# Patient Record
Sex: FEMALE | Race: WHITE | NOT HISPANIC OR LATINO | URBAN - METROPOLITAN AREA
[De-identification: names, ages, dates, MRNs, and addresses within clinical notes are randomized per-mention and may not be internally consistent; named-entity substitution may affect disease eponyms.]

---

## 2017-10-07 ENCOUNTER — OUTPATIENT (OUTPATIENT)
Dept: OUTPATIENT SERVICES | Facility: HOSPITAL | Age: 52
LOS: 1 days | End: 2017-10-07
Payer: COMMERCIAL

## 2017-10-07 ENCOUNTER — APPOINTMENT (OUTPATIENT)
Dept: MRI IMAGING | Facility: IMAGING CENTER | Age: 52
End: 2017-10-07
Payer: COMMERCIAL

## 2017-10-07 DIAGNOSIS — Z00.8 ENCOUNTER FOR OTHER GENERAL EXAMINATION: ICD-10-CM

## 2017-10-07 PROCEDURE — C8908: CPT

## 2017-10-07 PROCEDURE — 0159T: CPT | Mod: 26

## 2017-10-07 PROCEDURE — C8937: CPT

## 2017-10-07 PROCEDURE — 77059 MRI BREAST BILATERAL: CPT | Mod: 26

## 2017-10-07 PROCEDURE — A9585: CPT

## 2017-10-16 DIAGNOSIS — R92.8 OTHER ABNORMAL AND INCONCLUSIVE FINDINGS ON DIAGNOSTIC IMAGING OF BREAST: ICD-10-CM

## 2017-10-16 DIAGNOSIS — Z85.3 PERSONAL HISTORY OF MALIGNANT NEOPLASM OF BREAST: ICD-10-CM

## 2017-10-16 DIAGNOSIS — N60.89 OTHER BENIGN MAMMARY DYSPLASIAS OF UNSPECIFIED BREAST: ICD-10-CM

## 2019-03-16 ENCOUNTER — OUTPATIENT (OUTPATIENT)
Dept: OUTPATIENT SERVICES | Facility: HOSPITAL | Age: 54
LOS: 1 days | End: 2019-03-16
Payer: COMMERCIAL

## 2019-03-16 ENCOUNTER — APPOINTMENT (OUTPATIENT)
Dept: ULTRASOUND IMAGING | Facility: IMAGING CENTER | Age: 54
End: 2019-03-16
Payer: COMMERCIAL

## 2019-03-16 DIAGNOSIS — Z00.8 ENCOUNTER FOR OTHER GENERAL EXAMINATION: ICD-10-CM

## 2019-03-16 PROCEDURE — 76700 US EXAM ABDOM COMPLETE: CPT

## 2019-03-16 PROCEDURE — 76700 US EXAM ABDOM COMPLETE: CPT | Mod: 26

## 2020-04-25 ENCOUNTER — MESSAGE (OUTPATIENT)
Age: 55
End: 2020-04-25

## 2020-05-01 LAB
SARS-COV-2 IGG SERPL IA-ACNC: <0.1 INDEX
SARS-COV-2 IGG SERPL QL IA: NEGATIVE

## 2020-11-23 ENCOUNTER — EMERGENCY (EMERGENCY)
Facility: HOSPITAL | Age: 55
LOS: 1 days | Discharge: HOME | End: 2020-11-23
Attending: PERSONAL EMERGENCY RESPONSE ATTENDANT
Payer: COMMERCIAL

## 2020-11-23 VITALS
DIASTOLIC BLOOD PRESSURE: 94 MMHG | OXYGEN SATURATION: 100 % | TEMPERATURE: 99 F | SYSTOLIC BLOOD PRESSURE: 144 MMHG | HEART RATE: 100 BPM | RESPIRATION RATE: 19 BRPM

## 2020-11-23 VITALS
SYSTOLIC BLOOD PRESSURE: 158 MMHG | DIASTOLIC BLOOD PRESSURE: 74 MMHG | TEMPERATURE: 99 F | WEIGHT: 160.06 LBS | OXYGEN SATURATION: 100 % | RESPIRATION RATE: 18 BRPM | HEART RATE: 60 BPM | HEIGHT: 65 IN

## 2020-11-23 LAB
ALBUMIN SERPL ELPH-MCNC: 4.4 G/DL — SIGNIFICANT CHANGE UP (ref 3.3–5)
ALP SERPL-CCNC: 68 U/L — SIGNIFICANT CHANGE UP (ref 40–120)
ALT FLD-CCNC: 24 U/L — SIGNIFICANT CHANGE UP (ref 10–45)
ANION GAP SERPL CALC-SCNC: 10 MMOL/L — SIGNIFICANT CHANGE UP (ref 5–17)
APTT BLD: 31.4 SEC — SIGNIFICANT CHANGE UP (ref 27.5–35.5)
AST SERPL-CCNC: 25 U/L — SIGNIFICANT CHANGE UP (ref 10–40)
BASOPHILS # BLD AUTO: 0.03 K/UL — SIGNIFICANT CHANGE UP (ref 0–0.2)
BASOPHILS NFR BLD AUTO: 0.4 % — SIGNIFICANT CHANGE UP (ref 0–2)
BILIRUB SERPL-MCNC: 0.3 MG/DL — SIGNIFICANT CHANGE UP (ref 0.2–1.2)
BUN SERPL-MCNC: 15 MG/DL — SIGNIFICANT CHANGE UP (ref 7–23)
CALCIUM SERPL-MCNC: 10 MG/DL — SIGNIFICANT CHANGE UP (ref 8.4–10.5)
CHLORIDE SERPL-SCNC: 104 MMOL/L — SIGNIFICANT CHANGE UP (ref 96–108)
CO2 SERPL-SCNC: 27 MMOL/L — SIGNIFICANT CHANGE UP (ref 22–31)
CREAT SERPL-MCNC: 0.85 MG/DL — SIGNIFICANT CHANGE UP (ref 0.5–1.3)
D DIMER BLD IA.RAPID-MCNC: <150 NG/ML DDU — SIGNIFICANT CHANGE UP
EOSINOPHIL # BLD AUTO: 0.1 K/UL — SIGNIFICANT CHANGE UP (ref 0–0.5)
EOSINOPHIL NFR BLD AUTO: 1.2 % — SIGNIFICANT CHANGE UP (ref 0–6)
GLUCOSE SERPL-MCNC: 104 MG/DL — HIGH (ref 70–99)
HCT VFR BLD CALC: 42.9 % — SIGNIFICANT CHANGE UP (ref 34.5–45)
HGB BLD-MCNC: 14.1 G/DL — SIGNIFICANT CHANGE UP (ref 11.5–15.5)
IMM GRANULOCYTES NFR BLD AUTO: 0.2 % — SIGNIFICANT CHANGE UP (ref 0–1.5)
INR BLD: 1.04 RATIO — SIGNIFICANT CHANGE UP (ref 0.88–1.16)
LYMPHOCYTES # BLD AUTO: 1.93 K/UL — SIGNIFICANT CHANGE UP (ref 1–3.3)
LYMPHOCYTES # BLD AUTO: 23.1 % — SIGNIFICANT CHANGE UP (ref 13–44)
MAGNESIUM SERPL-MCNC: 2 MG/DL — SIGNIFICANT CHANGE UP (ref 1.6–2.6)
MCHC RBC-ENTMCNC: 29.7 PG — SIGNIFICANT CHANGE UP (ref 27–34)
MCHC RBC-ENTMCNC: 32.9 GM/DL — SIGNIFICANT CHANGE UP (ref 32–36)
MCV RBC AUTO: 90.5 FL — SIGNIFICANT CHANGE UP (ref 80–100)
MONOCYTES # BLD AUTO: 0.48 K/UL — SIGNIFICANT CHANGE UP (ref 0–0.9)
MONOCYTES NFR BLD AUTO: 5.7 % — SIGNIFICANT CHANGE UP (ref 2–14)
NEUTROPHILS # BLD AUTO: 5.8 K/UL — SIGNIFICANT CHANGE UP (ref 1.8–7.4)
NEUTROPHILS NFR BLD AUTO: 69.4 % — SIGNIFICANT CHANGE UP (ref 43–77)
NRBC # BLD: 0 /100 WBCS — SIGNIFICANT CHANGE UP (ref 0–0)
PLATELET # BLD AUTO: 275 K/UL — SIGNIFICANT CHANGE UP (ref 150–400)
POTASSIUM SERPL-MCNC: 4.4 MMOL/L — SIGNIFICANT CHANGE UP (ref 3.5–5.3)
POTASSIUM SERPL-SCNC: 4.4 MMOL/L — SIGNIFICANT CHANGE UP (ref 3.5–5.3)
PROT SERPL-MCNC: 7.2 G/DL — SIGNIFICANT CHANGE UP (ref 6–8.3)
PROTHROM AB SERPL-ACNC: 12.4 SEC — SIGNIFICANT CHANGE UP (ref 10.6–13.6)
RBC # BLD: 4.74 M/UL — SIGNIFICANT CHANGE UP (ref 3.8–5.2)
RBC # FLD: 11.5 % — SIGNIFICANT CHANGE UP (ref 10.3–14.5)
SARS-COV-2 RNA SPEC QL NAA+PROBE: SIGNIFICANT CHANGE UP
SODIUM SERPL-SCNC: 141 MMOL/L — SIGNIFICANT CHANGE UP (ref 135–145)
WBC # BLD: 8.36 K/UL — SIGNIFICANT CHANGE UP (ref 3.8–10.5)
WBC # FLD AUTO: 8.36 K/UL — SIGNIFICANT CHANGE UP (ref 3.8–10.5)

## 2020-11-23 PROCEDURE — 85379 FIBRIN DEGRADATION QUANT: CPT

## 2020-11-23 PROCEDURE — 83735 ASSAY OF MAGNESIUM: CPT

## 2020-11-23 PROCEDURE — 80053 COMPREHEN METABOLIC PANEL: CPT

## 2020-11-23 PROCEDURE — U0003: CPT

## 2020-11-23 PROCEDURE — 85730 THROMBOPLASTIN TIME PARTIAL: CPT

## 2020-11-23 PROCEDURE — 71045 X-RAY EXAM CHEST 1 VIEW: CPT

## 2020-11-23 PROCEDURE — 85610 PROTHROMBIN TIME: CPT

## 2020-11-23 PROCEDURE — 99284 EMERGENCY DEPT VISIT MOD MDM: CPT

## 2020-11-23 PROCEDURE — 71045 X-RAY EXAM CHEST 1 VIEW: CPT | Mod: 26

## 2020-11-23 PROCEDURE — 36415 COLL VENOUS BLD VENIPUNCTURE: CPT

## 2020-11-23 PROCEDURE — 85025 COMPLETE CBC W/AUTO DIFF WBC: CPT

## 2020-11-23 PROCEDURE — 99283 EMERGENCY DEPT VISIT LOW MDM: CPT | Mod: 25

## 2020-11-23 RX ORDER — SODIUM CHLORIDE 9 MG/ML
500 INJECTION INTRAMUSCULAR; INTRAVENOUS; SUBCUTANEOUS ONCE
Refills: 0 | Status: COMPLETED | OUTPATIENT
Start: 2020-11-23 | End: 2020-11-23

## 2020-11-23 RX ADMIN — SODIUM CHLORIDE 500 MILLILITER(S): 9 INJECTION INTRAMUSCULAR; INTRAVENOUS; SUBCUTANEOUS at 11:17

## 2020-11-23 NOTE — ED PROVIDER NOTE - PATIENT PORTAL LINK FT
You can access the FollowMyHealth Patient Portal offered by [facility] by registering at the following website: [email or web address]. By joining "[employer]’s FollowMyHealth portal, you will also be able to view your health information using other applications (apps) compatible with our system.

## 2020-11-23 NOTE — ED PROVIDER NOTE - ATTENDING CONTRIBUTION TO CARE
Attending MD Herrera.  Agree with above.  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 Pain is non-exertional but has been constant since it started today.  Over the weekend had generalized fatigue/malaise and had one episode diarrhea.  Pt denies fevers and endorses mild cough.  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  Cough has been present x 1 wk but has worsened today.  Pt otherwise well.  No assoc n/v.  No LE edema.  PT has been taking ibu profen and drinking tea at home.  PT has clear bilateral breath sounds.  Adomen soft/non-tender.  Pt very well appearing and low-risk for PE however main complaint today is discomfort with deep inspiration.  Pt to have screening labs and d-dimer for risk stratification.

## 2020-11-23 NOTE — ED PROVIDER NOTE - CLINICAL SUMMARY MEDICAL DECISION MAKING FREE TEXT BOX
Pt is A & O x 4 in no resp distress at this time.  Counseled extensively re: coronavirus risk and need to self quarantine for 2 wks or until results return+sx improvement.  Educated extensively that though they do not currently warrant admission for supportive care, should they develop inc SOB/cough/weakness/fevers that do not respond to tylenol/motrin, inability to tolerate PO they need to return to ED for reassessment as they may then warrant supportive care measures.  Follow-up with PCP in 1 wk for reassessment.  Pt given note for 2 wk quarantine instructions for work and verbalizes understanding of above return precautions and follow-up plan.

## 2020-11-23 NOTE — ED PROVIDER NOTE - OBJECTIVE STATEMENT
54 yo female PMHx 54 yo female PMHx GERD, b/l mastectomy 2/2 BRCA precursor. 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 States has been around COVID pts in the hospital, was tested but has not received results yet. Reports just feeling very tired and run down the past few days. Woke up this AM with the chest irritation, described as mild, can't tell if it's her GERD or PNA. Felt most when she breathes in or coughs. Denies shortness of breath, dyspnea on exertion, n/v/d, abdominal pain, recent travel, neck pain/stiffness, hemoptysis, LE swelling, calf pain.

## 2020-11-23 NOTE — ED ADULT NURSE NOTE - NSIMPLEMENTINTERV_GEN_ALL_ED
Implemented All Universal Safety Interventions:  [place] to call system. Call bell, personal items and telephone within reach. Instruct patient to call for assistance. Room bathroom lighting operational. Non-slip footwear when patient is off stretcher. Physically safe environment: no spills, clutter or unnecessary equipment. Stretcher in lowest position, wheels locked, appropriate side rails in place.

## 2020-11-23 NOTE — ED PROVIDER NOTE - NSFOLLOWUPINSTRUCTIONS_ED_ALL_ED_FT
1. Follow up with your primary doctor in 1-2 days.     1. You were seen in the ED and underwent testing for the novel coronarvirus (COVID-19).  You will be notified if you test positive.    2. Until your test results are back, YOU MUST SELF-QUARANTINE until you are told to other otherwise by [facility] or the local [facility] department. This is extremely important to limit the spread of this virus. Please refer closely to the packet provided to you on the specifics of the process of self-quarantine.    3. If you end up testing positive for the virus, you will instructed as to when you can return to your usual activities. If you do not hear from anyone in 7 days, please call [phone] or your local health department for guidance.     4. Return to the ED for difficulty breathing, vomiting, abdominal pain, chest pain or any other concerning symptoms.     5. You may over the counter acetaminophen (Tylenol) 650mg every 6 hours as needed for fever greater than 100.4 or pain.  Do NOT exceed 3500mg acetaminophen in 24 hours.  Please do not take these medications if you do not have pain or fever or if you have any history of liver disease.     -------------    What is a coronavirus?  Coronaviruses are a large family of viruses that cause illnesses ranging from the common cold to more severe diseases such as Middle East Respiratory Syndrome (MERS) and Severe Acute Respiratory Syndrome (SARS).    What is Novel Coronavirus (COVID-19)?  The Centers for Disease Control and Prevention (CDC) is closely monitoring the outbreak caused by COVID-19. For the latest information about COVID-19, visit the CDC website at CDC.gov/Coronavirus    How are coronaviruses spread?  Coronaviruses can be transmitted from person to person, usually after close contact with an infected  person (for example, in a household, workplace, or healthcare setting), via droplets that become airborne after a cough or sneeze. These droplets can then infect a nearby person. Transmission can also occur by touching recently contaminated surfaces.    Is there a treatment for a COVID-19?  There is no specific treatment for disease caused by COVID-19. However, many of the symptoms can be treated based on the patient’s clinical condition. Supportive care for infected persons can be highly effective.    What are the symptoms of coronavirus infection?  It depends on the virus, but common signs include fever and/or respiratory symptoms such as cough and shortness of breath. In more severe cases, infection can cause pneumonia, severe acute respiratory syndrome, kidney failure and even death. Fortunately, most cases of COVID-19 have an illness no different than the influenza (flu), with a majority of these patients having mild symptoms and overall mortality which appears to be not much different than the flu.    What can I do to protect myself?  The best precautionary measures:  – washing your hands  – covering your cough  – disinfecting surfaces  – it is also advisable to avoid close contact with anyone showing symptoms of respiratory illness such as coughing and sneezing  – those with symptoms should wear a surgical mask when around others    What can I do to protect those around me?  If you have been identified as someone who may be infected with COVID-19, we recommend you follow the self-isolation procedures outlined on the following page to protect those around you and to limit the spread of this virus.    We recommend the below precautionary steps from now until 14 days from when you returned from your travel or date of your last known possible contact:    — Do not go to work, school or public areas. Avoid using public transportation, ridesharing or taxis.  — As much as possible, separate yourself from other people in your home. If you can, you should stay in a room and away from other people. Also, you should use a separate bathroom if available.  — Wear the supplied mask whenever you are around other people.  — If you have a non-urgent medical appointment, please reschedule for a later date. If the appointment is urgent, please call the health care provider and tell them that you are on self-isolation for possible COVID-19. This will help the health care provider’s office take steps to keep other people from getting infected or exposed. If you can reschedule routine appointments, do so.  — Wash your hands often with soap and water for at least 15 to 20 seconds or clean your hands with an alcohol-based hand  that contains 60 to 95% alcohol, covering all surfaces of your hands and rubbing them together until they feel dry. Soap and water should be used preferentially if hands are visibly dirty.  — Cover your mouth and nose with a tissue when you cough or sneeze. Throw used tissues in a lined trash can. Immediately wash your hands.  — Avoid touching your eyes, nose, and mouth with your hands.  — Avoid sharing personal household items. You should not share dishes, drinking glasses, cups, eating utensils, towels, or bedding with other people or pets in your home. After using these items, they should be washed thoroughly with soap and water.  — Clean and disinfect all “high-touch” surfaces every day. High touch surfaces include counters, tabletops, doorknobs, light switches, remote controls, bathroom fixtures, toilets, phones, keyboards, tablets, and bedside tables. Also, clean any surfaces that may have blood, stool, or body fluids on them.    ------------------------------------------  Information for patients who have received a COVID-19 test.    The COVID-19 (novel coronavirus) test  Results may take up to 7 days to become available.      If your result is positive, you will receive a phone call from one of our coronavirus specialists. While we will do our best to also call patients with a negative test result, the sheer volume of tests being performed may make this difficult to do in a timely fashion. If you haven’t heard from us within 5 days and you’d like to check on your results, you can check our [employer] jacklyn or call one of our coronavirus specialists at [address] [place] [address] (available 24/7)    Please DO NOT call the site where you received the test to obtain your results.    If the test is positive -   You will continue home isolation until you are completely well, you have no fever, and it has been at least 14 days since your positive test. The health department in your city or county may also contact you with additional instructions.    If your test is negative -    You will be able to stop home isolation and resume standard precautions, similar to how you would manage the common cold or flu.  If you have any questions, you can reach out to one of our coronavirus specialists at [address] [place] [address].    REMEMBER - a negative COVID test means you were negative AT THE TIME OF TESTING, and it is possible to have contracted COVID after being tested.

## 2020-11-23 NOTE — ED PROVIDER NOTE - PROGRESS NOTE DETAILS
labs non-actionable, dimer negative, CXR clear, vitals stable. Will d/c home with instructions to continue quarantine pending COVID result. Advised pt on strict return precautions and counseled on all follow up information. Attending aware, stable for d/c. - Jerrod Cook PA-C

## 2020-11-23 NOTE — ED PROVIDER NOTE - PMH
Anxiety    Atypical Lobular Hyperplasia of Breast    Family History of Breast Cancer    Hiatal Hernia    MVP (Mitral Valve Prolapse)

## 2020-11-23 NOTE — ED ADULT NURSE NOTE - OBJECTIVE STATEMENT
Patient presents to Ed with c/o chest discomfort cough and fatigue. Patient reports experiencing cough and fatigue for   3 days and one episode of diarrhea yesterday and chest discomfort on inspiration. Patient reports having a Covid test done  today at [facility] and was told results unavailable for 24 to 48 hours. Patient denies chest pain or sob, no   nausea vomiting fever chills +mild headache no dizziness. RAC 18g iv lock placed, labs drawn and sent,

## 2023-12-09 ENCOUNTER — EMERGENCY (EMERGENCY)
Facility: HOSPITAL | Age: 58
LOS: 1 days | Discharge: HOME | End: 2023-12-09
Attending: EMERGENCY MEDICINE
Payer: MEDICAID

## 2023-12-09 VITALS
SYSTOLIC BLOOD PRESSURE: 120 MMHG | RESPIRATION RATE: 20 BRPM | DIASTOLIC BLOOD PRESSURE: 74 MMHG | HEART RATE: 48 BPM | TEMPERATURE: 98 F | OXYGEN SATURATION: 97 %

## 2023-12-09 VITALS
SYSTOLIC BLOOD PRESSURE: 159 MMHG | RESPIRATION RATE: 16 BRPM | HEART RATE: 47 BPM | WEIGHT: 160.06 LBS | OXYGEN SATURATION: 98 % | DIASTOLIC BLOOD PRESSURE: 76 MMHG | TEMPERATURE: 98 F | HEIGHT: 65 IN

## 2023-12-09 LAB
ALBUMIN SERPL ELPH-MCNC: 4.8 G/DL — SIGNIFICANT CHANGE UP (ref 3.3–5)
ALBUMIN SERPL ELPH-MCNC: 4.8 G/DL — SIGNIFICANT CHANGE UP (ref 3.3–5)
ALP SERPL-CCNC: 88 U/L — SIGNIFICANT CHANGE UP (ref 40–120)
ALP SERPL-CCNC: 88 U/L — SIGNIFICANT CHANGE UP (ref 40–120)
ALT FLD-CCNC: 44 U/L — SIGNIFICANT CHANGE UP (ref 10–45)
ALT FLD-CCNC: 44 U/L — SIGNIFICANT CHANGE UP (ref 10–45)
ANION GAP SERPL CALC-SCNC: 14 MMOL/L — SIGNIFICANT CHANGE UP (ref 5–17)
ANION GAP SERPL CALC-SCNC: 14 MMOL/L — SIGNIFICANT CHANGE UP (ref 5–17)
AST SERPL-CCNC: 32 U/L — SIGNIFICANT CHANGE UP (ref 10–40)
AST SERPL-CCNC: 32 U/L — SIGNIFICANT CHANGE UP (ref 10–40)
BASOPHILS # BLD AUTO: 0.06 K/UL — SIGNIFICANT CHANGE UP (ref 0–0.2)
BASOPHILS # BLD AUTO: 0.06 K/UL — SIGNIFICANT CHANGE UP (ref 0–0.2)
BASOPHILS NFR BLD AUTO: 0.6 % — SIGNIFICANT CHANGE UP (ref 0–2)
BASOPHILS NFR BLD AUTO: 0.6 % — SIGNIFICANT CHANGE UP (ref 0–2)
BILIRUB SERPL-MCNC: 0.3 MG/DL — SIGNIFICANT CHANGE UP (ref 0.2–1.2)
BILIRUB SERPL-MCNC: 0.3 MG/DL — SIGNIFICANT CHANGE UP (ref 0.2–1.2)
BUN SERPL-MCNC: 16 MG/DL — SIGNIFICANT CHANGE UP (ref 7–23)
BUN SERPL-MCNC: 16 MG/DL — SIGNIFICANT CHANGE UP (ref 7–23)
CALCIUM SERPL-MCNC: 10.3 MG/DL — SIGNIFICANT CHANGE UP (ref 8.4–10.5)
CALCIUM SERPL-MCNC: 10.3 MG/DL — SIGNIFICANT CHANGE UP (ref 8.4–10.5)
CHLORIDE SERPL-SCNC: 105 MMOL/L — SIGNIFICANT CHANGE UP (ref 96–108)
CHLORIDE SERPL-SCNC: 105 MMOL/L — SIGNIFICANT CHANGE UP (ref 96–108)
CK MB CFR SERPL CALC: 2 NG/ML — SIGNIFICANT CHANGE UP (ref 0–3.8)
CK MB CFR SERPL CALC: 2 NG/ML — SIGNIFICANT CHANGE UP (ref 0–3.8)
CO2 SERPL-SCNC: 22 MMOL/L — SIGNIFICANT CHANGE UP (ref 22–31)
CO2 SERPL-SCNC: 22 MMOL/L — SIGNIFICANT CHANGE UP (ref 22–31)
CREAT SERPL-MCNC: 0.94 MG/DL — SIGNIFICANT CHANGE UP (ref 0.5–1.3)
CREAT SERPL-MCNC: 0.94 MG/DL — SIGNIFICANT CHANGE UP (ref 0.5–1.3)
EGFR: 69 ML/MIN/1.73M2 — SIGNIFICANT CHANGE UP
EGFR: 69 ML/MIN/1.73M2 — SIGNIFICANT CHANGE UP
EOSINOPHIL # BLD AUTO: 0.14 K/UL — SIGNIFICANT CHANGE UP (ref 0–0.5)
EOSINOPHIL # BLD AUTO: 0.14 K/UL — SIGNIFICANT CHANGE UP (ref 0–0.5)
EOSINOPHIL NFR BLD AUTO: 1.4 % — SIGNIFICANT CHANGE UP (ref 0–6)
EOSINOPHIL NFR BLD AUTO: 1.4 % — SIGNIFICANT CHANGE UP (ref 0–6)
GLUCOSE SERPL-MCNC: 90 MG/DL — SIGNIFICANT CHANGE UP (ref 70–99)
GLUCOSE SERPL-MCNC: 90 MG/DL — SIGNIFICANT CHANGE UP (ref 70–99)
HCT VFR BLD CALC: 44.8 % — SIGNIFICANT CHANGE UP (ref 34.5–45)
HCT VFR BLD CALC: 44.8 % — SIGNIFICANT CHANGE UP (ref 34.5–45)
HGB BLD-MCNC: 14.5 G/DL — SIGNIFICANT CHANGE UP (ref 11.5–15.5)
HGB BLD-MCNC: 14.5 G/DL — SIGNIFICANT CHANGE UP (ref 11.5–15.5)
IMM GRANULOCYTES NFR BLD AUTO: 0.2 % — SIGNIFICANT CHANGE UP (ref 0–0.9)
IMM GRANULOCYTES NFR BLD AUTO: 0.2 % — SIGNIFICANT CHANGE UP (ref 0–0.9)
LIDOCAIN IGE QN: 40 U/L — SIGNIFICANT CHANGE UP (ref 7–60)
LIDOCAIN IGE QN: 40 U/L — SIGNIFICANT CHANGE UP (ref 7–60)
LYMPHOCYTES # BLD AUTO: 29.6 % — SIGNIFICANT CHANGE UP (ref 13–44)
LYMPHOCYTES # BLD AUTO: 29.6 % — SIGNIFICANT CHANGE UP (ref 13–44)
LYMPHOCYTES # BLD AUTO: 3 K/UL — SIGNIFICANT CHANGE UP (ref 1–3.3)
LYMPHOCYTES # BLD AUTO: 3 K/UL — SIGNIFICANT CHANGE UP (ref 1–3.3)
MCHC RBC-ENTMCNC: 30 PG — SIGNIFICANT CHANGE UP (ref 27–34)
MCHC RBC-ENTMCNC: 30 PG — SIGNIFICANT CHANGE UP (ref 27–34)
MCHC RBC-ENTMCNC: 32.4 GM/DL — SIGNIFICANT CHANGE UP (ref 32–36)
MCHC RBC-ENTMCNC: 32.4 GM/DL — SIGNIFICANT CHANGE UP (ref 32–36)
MCV RBC AUTO: 92.6 FL — SIGNIFICANT CHANGE UP (ref 80–100)
MCV RBC AUTO: 92.6 FL — SIGNIFICANT CHANGE UP (ref 80–100)
MONOCYTES # BLD AUTO: 0.7 K/UL — SIGNIFICANT CHANGE UP (ref 0–0.9)
MONOCYTES # BLD AUTO: 0.7 K/UL — SIGNIFICANT CHANGE UP (ref 0–0.9)
MONOCYTES NFR BLD AUTO: 6.9 % — SIGNIFICANT CHANGE UP (ref 2–14)
MONOCYTES NFR BLD AUTO: 6.9 % — SIGNIFICANT CHANGE UP (ref 2–14)
NEUTROPHILS # BLD AUTO: 6.2 K/UL — SIGNIFICANT CHANGE UP (ref 1.8–7.4)
NEUTROPHILS # BLD AUTO: 6.2 K/UL — SIGNIFICANT CHANGE UP (ref 1.8–7.4)
NEUTROPHILS NFR BLD AUTO: 61.3 % — SIGNIFICANT CHANGE UP (ref 43–77)
NEUTROPHILS NFR BLD AUTO: 61.3 % — SIGNIFICANT CHANGE UP (ref 43–77)
NRBC # BLD: 0 /100 WBCS — SIGNIFICANT CHANGE UP (ref 0–0)
NRBC # BLD: 0 /100 WBCS — SIGNIFICANT CHANGE UP (ref 0–0)
PLATELET # BLD AUTO: 353 K/UL — SIGNIFICANT CHANGE UP (ref 150–400)
PLATELET # BLD AUTO: 353 K/UL — SIGNIFICANT CHANGE UP (ref 150–400)
POTASSIUM SERPL-MCNC: 4.4 MMOL/L — SIGNIFICANT CHANGE UP (ref 3.5–5.3)
POTASSIUM SERPL-MCNC: 4.4 MMOL/L — SIGNIFICANT CHANGE UP (ref 3.5–5.3)
POTASSIUM SERPL-SCNC: 4.4 MMOL/L — SIGNIFICANT CHANGE UP (ref 3.5–5.3)
POTASSIUM SERPL-SCNC: 4.4 MMOL/L — SIGNIFICANT CHANGE UP (ref 3.5–5.3)
PROT SERPL-MCNC: 7.8 G/DL — SIGNIFICANT CHANGE UP (ref 6–8.3)
PROT SERPL-MCNC: 7.8 G/DL — SIGNIFICANT CHANGE UP (ref 6–8.3)
RBC # BLD: 4.84 M/UL — SIGNIFICANT CHANGE UP (ref 3.8–5.2)
RBC # BLD: 4.84 M/UL — SIGNIFICANT CHANGE UP (ref 3.8–5.2)
RBC # FLD: 11.9 % — SIGNIFICANT CHANGE UP (ref 10.3–14.5)
RBC # FLD: 11.9 % — SIGNIFICANT CHANGE UP (ref 10.3–14.5)
SODIUM SERPL-SCNC: 141 MMOL/L — SIGNIFICANT CHANGE UP (ref 135–145)
SODIUM SERPL-SCNC: 141 MMOL/L — SIGNIFICANT CHANGE UP (ref 135–145)
TROPONIN T, HIGH SENSITIVITY RESULT: <6 NG/L — SIGNIFICANT CHANGE UP (ref 0–51)
TROPONIN T, HIGH SENSITIVITY RESULT: <6 NG/L — SIGNIFICANT CHANGE UP (ref 0–51)
WBC # BLD: 10.12 K/UL — SIGNIFICANT CHANGE UP (ref 3.8–10.5)
WBC # BLD: 10.12 K/UL — SIGNIFICANT CHANGE UP (ref 3.8–10.5)
WBC # FLD AUTO: 10.12 K/UL — SIGNIFICANT CHANGE UP (ref 3.8–10.5)
WBC # FLD AUTO: 10.12 K/UL — SIGNIFICANT CHANGE UP (ref 3.8–10.5)

## 2023-12-09 PROCEDURE — 83690 ASSAY OF LIPASE: CPT

## 2023-12-09 PROCEDURE — 36415 COLL VENOUS BLD VENIPUNCTURE: CPT

## 2023-12-09 PROCEDURE — 99285 EMERGENCY DEPT VISIT HI MDM: CPT

## 2023-12-09 PROCEDURE — 71046 X-RAY EXAM CHEST 2 VIEWS: CPT | Mod: 26

## 2023-12-09 PROCEDURE — 71046 X-RAY EXAM CHEST 2 VIEWS: CPT

## 2023-12-09 PROCEDURE — 99284 EMERGENCY DEPT VISIT MOD MDM: CPT | Mod: 25

## 2023-12-09 PROCEDURE — 82553 CREATINE MB FRACTION: CPT

## 2023-12-09 PROCEDURE — 85025 COMPLETE CBC W/AUTO DIFF WBC: CPT

## 2023-12-09 PROCEDURE — 96374 THER/PROPH/DIAG INJ IV PUSH: CPT

## 2023-12-09 PROCEDURE — 84484 ASSAY OF TROPONIN QUANT: CPT

## 2023-12-09 PROCEDURE — 80053 COMPREHEN METABOLIC PANEL: CPT

## 2023-12-09 RX ORDER — KETOROLAC TROMETHAMINE 30 MG/ML
15 SYRINGE (ML) INJECTION ONCE
Refills: 0 | Status: DISCONTINUED | OUTPATIENT
Start: 2023-12-09 | End: 2023-12-09

## 2023-12-09 RX ADMIN — Medication 15 MILLIGRAM(S): at 15:08

## 2023-12-09 NOTE — ED ADULT NURSE NOTE - NSICDXPASTSURGICALHX_GEN_ALL_CORE_FT
PAST SURGICAL HISTORY:  Cyst surgically removed from lip age 6    Injury of Finger age 1, surgical correction left 3rd finger    S/P Breast Lumpectomy 6/11 Atypical Lobular Hyperplasia

## 2023-12-09 NOTE — ED PROVIDER NOTE - ATTENDING CONTRIBUTION TO CARE
Attending MD Leong:  I personally have seen and examined this patient. I have performed a substantive portion of the visit including all aspects of the medical decision making.  Resident note reviewed and agree on plan of care and except where noted.      58-year-old woman with a history of double mastectomy with breast reconstruction is presenting for evaluation of 1 week of constant "gnawing" pain in the anterior chest radiating to the right armpit and below the right breast.  The pain is worse with movements.  It is not worse with deep inspiration.  She has no shortness of breath.  No calf pain or swelling bilaterally.  Denies any nausea vomit or diaphoresis.  She also feels that the pain is somewhat worse when she is lying on her abdomen.  She is taken Tylenol and ibuprofen that seems to help actually.  No skin changes over the areas of pain.    Patient's vital signs are notable for slightly low heart rate of 47 otherwise nonactionable.  Patient sitting in stretcher no apparent distress.  Clear lungs regular heart sounds.  The abdomen is soft nontender nondistended.  There is no calf tenderness or swelling bilaterally peripheral pulses palpable.  Breast examination (paula Moseley RN) with no appreciable palpable breast masses, no axillary adenopathy.  No skin changes right breast.  No expressible nipple discharge.    ECG without diagnostic ischemic findings.    Patient presenting evaluation of 1 week of constant right chest wall pain.  ECGs are reassuring however given age will rule out MI with heart biomarkers, fairly low suspicion for cardiac etiology of pain however.  Do not suspect pulmonary embolism in this patient clinical.  There is no obvious breast mass externally.  Will obtain screening chest x-ray lab work cardiac biomarkers reassessment.  Patient inquired about CT of the chest to rule out any deep pathology such as rib pathology or any lung pathology, stated at this time would like to start with a screening chest film and go from there.  Did advise the patient that if symptoms persist for more than 1 to 2 weeks, outpatient advanced chest imaging could be obtained at that time.  Patient is following up with her breast surgeon this upcoming Tuesday so this practitioner could perform dedicated breast imaging such as mammography or MRI of the chest to eval breast implant comprehensively.      *The above represents an initial assessment/impression. Please refer to progress notes for potential changes in patient clinical course* Attending MD Leong:  I personally have seen and examined this patient. I have performed a substantive portion of the visit including all aspects of the medical decision making.  Resident note reviewed and agree on plan of care and except where noted.      58-year-old woman with a history of double mastectomy with breast reconstruction is presenting for evaluation of 1 week of constant "gnawing" pain in the anterior chest radiating to the right armpit and below the right breast.  The pain is worse with movements.  It is not worse with deep inspiration.  She has no shortness of breath.  No calf pain or swelling bilaterally.  Denies any nausea vomit or diaphoresis.  She also feels that the pain is somewhat worse when she is lying on her abdomen.  She is taken Tylenol and ibuprofen that seems to help actually.  No skin changes over the areas of pain.    Patient's vital signs are notable for slightly low heart rate of 47 otherwise nonactionable.  Patient sitting in stretcher no apparent distress.  Clear lungs regular heart sounds.  The abdomen is soft nontender nondistended.  There is no calf tenderness or swelling bilaterally peripheral pulses palpable.  Breast examination (paula Moseley RN): right breast with no appreciable palpable breast masses, no axillary adenopathy.  No skin changes right breast.  No expressible nipple discharge.    ECG without diagnostic ischemic findings.    Patient presenting evaluation of 1 week of constant right chest wall pain.  ECGs are reassuring however given age will rule out MI with heart biomarkers, fairly low suspicion for cardiac etiology of pain however.  Do not suspect pulmonary embolism in this patient clinical.  There is no obvious breast mass externally.  Will obtain screening chest x-ray lab work cardiac biomarkers reassessment.  Patient inquired about CT of the chest to rule out any deep pathology such as rib pathology or any lung pathology, stated at this time would like to start with a screening chest film and go from there.  Did advise the patient that if symptoms persist for more than 1 to 2 weeks, outpatient advanced chest imaging could be obtained at that time.  Patient is following up with her breast surgeon this upcoming Tuesday so this practitioner could perform dedicated breast imaging such as mammography or MRI of the chest to eval breast implant comprehensively.      *The above represents an initial assessment/impression. Please refer to progress notes for potential changes in patient clinical course*

## 2023-12-09 NOTE — ED PROVIDER NOTE - OTHER FINDINGS
ECG recorded at 1157 independently interpreted by me, Dr Trenton Leong, shows normal sinus rhythm, normal QRS axis normal intervals.  T wave inversion lead aVL, T wave inversion lead V1 V2, no ST elevation or ST depression.  Compared with ECG dated 12/2011, no significant interval changes.

## 2023-12-09 NOTE — ED PROVIDER NOTE - CLINICAL SUMMARY MEDICAL DECISION MAKING FREE TEXT BOX
58-year-old female with past medical history of bilateral mastectomies with surgical reconstruction presents to the ED complaining of right-sided chest pain.  Patient states she feels like there is something heavy, or gas-like inside of her chest and she thinks it might be the implant.  Pain has been ongoing for 1 week, constant, and radiates to down to her lower abdomen and up to her right shoulder and her right scapula. she has been taking Motrin and Tylenol with minimal relief she is scheduled to see her breast surgeon in the next coming week.  She denies any trauma, excessive lifting, shortness of breath, headache, dizziness, abdominal pain, dysuria, hematuria,.  concern for acs vs msk. will screen with ekg, trops, and chest xray.   will treat symptoms with analgesic  will re-evaluate

## 2023-12-09 NOTE — ED PROVIDER NOTE - PHYSICAL EXAMINATION
Physical Exam:  Gen:  Well appearing, awake, alert, non-toxic appearing  Head: NCAT  HEENT: Normal conjunctiva, trachea midline, moist mucous membranes  Lung: CTAB, no respiratory distress, no wheezes/rhonchi/rales B/L, speaking in full sentences  CV: RRR, no murmurs, rubs or gallops  Abd: Soft, non-tender, non-distended,   MSK: No visible deformities, moves all four extremities, no muscle or joint tenderness  Neuro: No focal motor deficits  Skin: Warm, well perfused,  Psych: appropriate affect and mood

## 2023-12-09 NOTE — ED PROVIDER NOTE - OBJECTIVE STATEMENT
58-year-old female with past medical history of bilateral mastectomies with surgical reconstruction presents to the ED complaining of right-sided chest pain.  Patient states she feels like there is something heavy, or gas-like inside of her chest and she thinks it might be the implant.  Pain has been ongoing for 1 week, constant, and radiates to down to her lower abdomen and up to her right shoulder and her right scapula. she has been taking Motrin and Tylenol with minimal relief she is scheduled to see her breast surgeon in the next coming week.  She denies any trauma, excessive lifting, shortness of breath, headache, dizziness, abdominal pain, dysuria, hematuria,.

## 2023-12-09 NOTE — ED ADULT NURSE NOTE - OBJECTIVE STATEMENT
57 y/o female with PMH of Anxiety, Atypical Lobular Hyperplasia of Breast, Hiatal Hernia, Mitral Valve Prolapse arrives to the ER complaining of chest pain.  Pt reports worsening right sided chest pain for one week .  On assessment pt is well appearing, A&Ox4, speaking coherently, airway is patent, breathing spontaneously and unlabored. Skin is dry, warm. Abdomen is soft, no distended, no tender. Full ROM in all extremities.  Patient undressed and placed into gown, side rails up with bed locked and in lowest position for safety. call bell within reach. [place] provided. Comfort and safety provided. will continue to reassess. 57 y/o female with PMH of Anxiety, Atypical Lobular Hyperplasia of Breast, Hiatal Hernia, Mitral Valve Prolapse arrives to the ER complaining of chest pain.  Pt reports worsening right sided chest pain for one week .  On assessment pt is well appearing, A&Ox4, speaking coherently, airway is patent, breathing spontaneously and unlabored. Skin is dry, warm. Abdomen is soft, no distended, no tender. Full ROM in all extremities.  Patient undressed and placed into gown, side rails up with bed locked and in lowest position for safety. call bell within reach. [place] provided. Comfort and safety provided. will continue to reassess. 59 y/o female with PMH of Anxiety, Atypical Lobular Hyperplasia of Breast, Hiatal Hernia, Mitral Valve Prolapse arrives to the ER complaining of chest pain.  Pt reports constant worsening right sided chest pain with radiation to the back, the axilla area and down to the R side of the abdomen area for one week. pt reports taking over the counter medication without major relief. On assessment pt is well appearing, A&Ox4, speaking coherently, airway is patent, breathing spontaneously and unlabored. Skin is dry, warm. Abdomen is soft, no distended, no tender. Full ROM in all extremities.  Patient undressed and placed into gown.  Pt placed on continuous cardiac monitor, sinus bradycardia noted, EKG performed, 20G LAC placed, blood sent to the lab.  Comfort and safety provided, side rails up with bed locked and in lowest position for safety. call bell within reach. [place] provided. 57 y/o female with PMH of Anxiety, Atypical Lobular Hyperplasia of Breast, Hiatal Hernia, Mitral Valve Prolapse arrives to the ER complaining of chest pain.  Pt reports constant worsening right sided chest pain with radiation to the back, the axilla area and down to the R side of the abdomen area for one week. pt reports taking over the counter medication without major relief. On assessment pt is well appearing, A&Ox4, speaking coherently, airway is patent, breathing spontaneously and unlabored. Skin is dry, warm. Abdomen is soft, no distended, no tender. Full ROM in all extremities.  Patient undressed and placed into gown.  Pt placed on continuous cardiac monitor, sinus bradycardia noted, EKG performed, 20G LAC placed, blood sent to the lab.  Comfort and safety provided, side rails up with bed locked and in lowest position for safety. call bell within reach. [place] provided.

## 2023-12-09 NOTE — ED PROVIDER NOTE - PATIENT PORTAL LINK FT
You can access the FollowMyHealth Patient Portal offered by [facility] by registering at the following website: [email or web address]. By joining [employer]’s FollowMyHealth portal, you will also be able to view your health information using other applications (apps) compatible with our system. You can access the FollowMyHealth Patient Portal offered by [facility] by registering at the following website: [email or web address]. By joining [employer]’s FollowMyHealth portal, you will also be able to view your health information using other applications (apps) compatible with our system.

## 2023-12-09 NOTE — ED ADULT NURSE NOTE - NSICDXPASTMEDICALHX_GEN_ALL_CORE_FT
PAST MEDICAL HISTORY:  Anxiety     Atypical Lobular Hyperplasia of Breast     Family History of Breast Cancer     Hiatal Hernia     MVP (Mitral Valve Prolapse)

## 2023-12-09 NOTE — ED PROVIDER NOTE - NSFOLLOWUPINSTRUCTIONS_ED_ALL_ED_FT
You were evaluated in the Emergency Department today for right sided chest pain. Your evaluation has shown no signs of medical conditions requiring emergent intervention at this time, however we recommend that you follow up with your primary care physician or your cardiologist as soon as possible for further testing as an outpatient.    Please follow up with your plastic surgeon to see if he would like further workup for the implant.    Return to the Emergency Department if you experience worsening or uncontrolled chest pain, shortness of breath, light headedness, feeling faint, nausea, vomiting, or any other concerning symptoms.

## 2023-12-21 ENCOUNTER — APPOINTMENT (OUTPATIENT)
Dept: MRI IMAGING | Facility: CLINIC | Age: 58
End: 2023-12-21
Payer: MEDICAID

## 2023-12-21 PROCEDURE — 77049 MRI BREAST C-+ W/CAD BI: CPT

## 2023-12-21 PROCEDURE — A9585: CPT

## 2024-05-16 NOTE — ED PROVIDER NOTE - HIV OFFER
PT in infusion post FUV with MD; refer to provider note for assessment. PT received ordered immunotherapy without incident. PT discharged with updated schedule.    Opt out

## 2024-10-12 ENCOUNTER — OUTPATIENT (OUTPATIENT)
Dept: OUTPATIENT SERVICES | Facility: HOSPITAL | Age: 59
LOS: 1 days | End: 2024-10-12
Payer: COMMERCIAL

## 2024-10-12 ENCOUNTER — APPOINTMENT (OUTPATIENT)
Dept: ULTRASOUND IMAGING | Facility: CLINIC | Age: 59
End: 2024-10-12
Payer: COMMERCIAL

## 2024-10-12 DIAGNOSIS — Z00.8 ENCOUNTER FOR OTHER GENERAL EXAMINATION: ICD-10-CM

## 2024-10-12 PROCEDURE — 76830 TRANSVAGINAL US NON-OB: CPT | Mod: 26

## 2024-10-12 PROCEDURE — 76856 US EXAM PELVIC COMPLETE: CPT | Mod: 26

## 2024-10-12 PROCEDURE — 76700 US EXAM ABDOM COMPLETE: CPT

## 2024-10-12 PROCEDURE — 76830 TRANSVAGINAL US NON-OB: CPT

## 2024-10-12 PROCEDURE — 76700 US EXAM ABDOM COMPLETE: CPT | Mod: 26

## 2024-10-12 PROCEDURE — 76856 US EXAM PELVIC COMPLETE: CPT
